# Patient Record
Sex: MALE | Race: WHITE | URBAN - METROPOLITAN AREA
[De-identification: names, ages, dates, MRNs, and addresses within clinical notes are randomized per-mention and may not be internally consistent; named-entity substitution may affect disease eponyms.]

---

## 2022-06-16 ENCOUNTER — OFFICE VISIT (OUTPATIENT)
Dept: URBAN - METROPOLITAN AREA CLINIC 27 | Facility: CLINIC | Age: 77
End: 2022-06-16
Payer: MEDICARE

## 2022-06-16 DIAGNOSIS — H43.811 VITREOUS DEGENERATION, RIGHT EYE: ICD-10-CM

## 2022-06-16 DIAGNOSIS — H35.352 CYSTOID MACULAR DEGENERATION, LEFT EYE: ICD-10-CM

## 2022-06-16 DIAGNOSIS — H33.41 PROLIFERATIVE VITREO-RETINOPATHY W/ RETINAL DETACHMENT OF RIGHT EYE: Primary | ICD-10-CM

## 2022-06-16 DIAGNOSIS — H33.8 OTHER RETINAL DETACHMENTS: ICD-10-CM

## 2022-06-16 DIAGNOSIS — Z96.1 PRESENCE OF INTRAOCULAR LENS: ICD-10-CM

## 2022-06-16 PROCEDURE — 99204 OFFICE O/P NEW MOD 45 MIN: CPT | Performed by: OPHTHALMOLOGY

## 2022-06-16 PROCEDURE — 92134 CPTRZ OPH DX IMG PST SGM RTA: CPT | Performed by: OPHTHALMOLOGY

## 2022-06-16 RX ORDER — PREDNISOLONE ACETATE 10 MG/ML
1 % SUSPENSION/ DROPS OPHTHALMIC
Qty: 5 | Refills: 3 | Status: INACTIVE
Start: 2022-06-16 | End: 2022-09-13

## 2022-06-16 RX ORDER — KETOROLAC TROMETHAMINE 5 MG/ML
0.5 % SOLUTION OPHTHALMIC
Qty: 5 | Refills: 3 | Status: INACTIVE
Start: 2022-06-16 | End: 2022-09-13

## 2022-06-16 ASSESSMENT — INTRAOCULAR PRESSURE
OS: 16
OD: 15

## 2022-06-16 NOTE — IMPRESSION/PLAN
Impression: Other retinal detachments: H33.8.
s/p SB/PPV/EL/AFX/C3F8 OS - 06/18/2020 Plan: --retina remains attached and stable
--RDW discussed

## 2022-06-16 NOTE — IMPRESSION/PLAN
Impression: Vitreous degeneration, right eye: H43.448.  Plan: --proceed with surgery as documented above

## 2022-06-16 NOTE — IMPRESSION/PLAN
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 Plan: --exam confirms a macula-involving RD with PVR-C
--findings/diagnosis d/w pt in detail --urgent surgery advised to prevent further vision loss --r/b/a of SB, PPV, and pneumatic retinopexy discussed
--risks discussed, inc bleeding, pain, infection, loss of vision --pt understands that pre-RD VA unlikely to fully return --pt elects to proceed with RD repair SURGICAL PLAN: 25G PPV/SB/MP/EL/GAS vs SO OD

## 2022-06-16 NOTE — IMPRESSION/PLAN
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  Plan: --moderate ERM with CME OS
--start PF/ketorolac QID OS
--will consider PPV/MP at a later date

## 2022-06-22 ENCOUNTER — POST-OPERATIVE VISIT (OUTPATIENT)
Dept: URBAN - METROPOLITAN AREA CLINIC 27 | Facility: CLINIC | Age: 77
End: 2022-06-22
Payer: MEDICARE

## 2022-06-22 DIAGNOSIS — H33.41 PROLIFERATIVE VITREO-RETINOPATHY W/ RETINAL DETACHMENT OF RIGHT EYE: Primary | ICD-10-CM

## 2022-06-22 PROCEDURE — 99024 POSTOP FOLLOW-UP VISIT: CPT | Performed by: OPHTHALMOLOGY

## 2022-06-22 ASSESSMENT — INTRAOCULAR PRESSURE
OD: 21
OS: 15

## 2022-06-22 NOTE — IMPRESSION/PLAN
Impression: S/P 25G PPV/SB/MP/EL/GAS vs SO OD - 06/21/2022 () Plan: Retina is attached. No s/s of infection IOP is good at (21) Drops Vigamox QID Pred QID 

RTC 1 week

## 2022-06-30 ENCOUNTER — POST-OPERATIVE VISIT (OUTPATIENT)
Dept: URBAN - METROPOLITAN AREA CLINIC 27 | Facility: CLINIC | Age: 77
End: 2022-06-30
Payer: MEDICARE

## 2022-06-30 DIAGNOSIS — H33.41 PROLIFERATIVE VITREO-RETINOPATHY W/ RETINAL DETACHMENT OF RIGHT EYE: Primary | ICD-10-CM

## 2022-06-30 PROCEDURE — 99024 POSTOP FOLLOW-UP VISIT: CPT | Performed by: OPHTHALMOLOGY

## 2022-06-30 ASSESSMENT — INTRAOCULAR PRESSURE
OS: 13
OD: 21

## 2022-06-30 NOTE — IMPRESSION/PLAN
Impression: S/P 25G PPV/SB/MP/EL/GAS vs SO OD - 9 Days. 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  Plan: --retina attached under gas
--no s/s infection
--IOP acceptable
--d/c Oflox, taper PF as directed
--RD/endoph WS discussed
--alt restrictions/positioning discussed
--f/u 1 month with PO/OCT OD

## 2022-08-11 ENCOUNTER — POST-OPERATIVE VISIT (OUTPATIENT)
Dept: URBAN - METROPOLITAN AREA CLINIC 27 | Facility: CLINIC | Age: 77
End: 2022-08-11
Payer: MEDICARE

## 2022-08-11 DIAGNOSIS — H33.41 PROLIFERATIVE VITREO-RETINOPATHY W/ RETINAL DETACHMENT OF RIGHT EYE: Primary | ICD-10-CM

## 2022-08-11 PROCEDURE — 99024 POSTOP FOLLOW-UP VISIT: CPT | Performed by: OPHTHALMOLOGY

## 2022-08-11 PROCEDURE — 92134 CPTRZ OPH DX IMG PST SGM RTA: CPT | Performed by: OPHTHALMOLOGY

## 2022-08-11 RX ORDER — OFLOXACIN 3 MG/ML
0.3 % SOLUTION/ DROPS OPHTHALMIC
Qty: 5 | Refills: 0 | Status: INACTIVE
Start: 2022-08-11 | End: 2022-09-09

## 2022-08-11 RX ORDER — PREDNISOLONE ACETATE 10 MG/ML
1 % SUSPENSION/ DROPS OPHTHALMIC
Qty: 5 | Refills: 3 | Status: INACTIVE
Start: 2022-08-11 | End: 2022-11-08

## 2022-08-11 ASSESSMENT — INTRAOCULAR PRESSURE
OS: 13
OD: 15

## 2022-08-11 NOTE — IMPRESSION/PLAN
Impression: S/P 25G PPV/SB/MP/EL/GAS vs SO OD - 51 Days. 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 Plan: --unfortunately there is a recurrent RD secondary to PVR-C
--additional surgery advised to prevent further vision loss --r/b/a of PPV, MP, EL, and GAS vs SO discussed
--risks discussed, inc bleeding, pain, infection, loss of vision --pt understands that pre-RD vision unlikely to fully return --pt elects to proceed with RD repair SURGICAL PLAN: 25G PPV/MP/EL/SO OD

## 2022-08-18 ENCOUNTER — POST-OPERATIVE VISIT (OUTPATIENT)
Dept: URBAN - METROPOLITAN AREA CLINIC 27 | Facility: CLINIC | Age: 77
End: 2022-08-18
Payer: MEDICARE

## 2022-08-18 DIAGNOSIS — H33.41 PROLIFERATIVE VITREO-RETINOPATHY W/ RETINAL DETACHMENT OF RIGHT EYE: Primary | ICD-10-CM

## 2022-08-18 PROCEDURE — 99024 POSTOP FOLLOW-UP VISIT: CPT | Performed by: OPHTHALMOLOGY

## 2022-08-18 ASSESSMENT — INTRAOCULAR PRESSURE
OS: 15
OD: 23

## 2022-08-18 NOTE — IMPRESSION/PLAN
Impression: S/P 25G PPV/MP/EL/SO OD OD - 1 Day.  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
25G PPV/SB/MP/EL/GAS OD 06/12/22 Plan: --retina attached under oil
--no s/s infection
--IOP acceptable
--start PF/Oflox QID
--RD/endoph WS discussed
--positioning discussed
--f/u 1 week PO OD

## 2022-08-25 ENCOUNTER — POST-OPERATIVE VISIT (OUTPATIENT)
Dept: URBAN - METROPOLITAN AREA CLINIC 27 | Facility: CLINIC | Age: 77
End: 2022-08-25
Payer: MEDICARE

## 2022-08-25 DIAGNOSIS — H33.41 PROLIFERATIVE VITREO-RETINOPATHY W/ RETINAL DETACHMENT OF RIGHT EYE: Primary | ICD-10-CM

## 2022-08-25 PROCEDURE — 99024 POSTOP FOLLOW-UP VISIT: CPT | Performed by: OPHTHALMOLOGY

## 2022-08-25 ASSESSMENT — INTRAOCULAR PRESSURE
OD: 11
OS: 13

## 2022-08-25 NOTE — IMPRESSION/PLAN
Impression: S/P 25G PPV/MP/EL/SO OD - 8 Days. 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  Plan: --retina attached under oil
--no s/s infection
--IOP acceptable
--d/c oflox, taper PF as directed
--RD/endoph WS discussed
--f/u 1 month with PO/OCT OD

## 2022-10-06 ENCOUNTER — POST-OPERATIVE VISIT (OUTPATIENT)
Dept: URBAN - METROPOLITAN AREA CLINIC 27 | Facility: CLINIC | Age: 77
End: 2022-10-06
Payer: MEDICARE

## 2022-10-06 DIAGNOSIS — H33.41 PROLIFERATIVE VITREO-RETINOPATHY W/ RETINAL DETACHMENT OF RIGHT EYE: Primary | ICD-10-CM

## 2022-10-06 PROCEDURE — 92134 CPTRZ OPH DX IMG PST SGM RTA: CPT | Performed by: OPHTHALMOLOGY

## 2022-10-06 PROCEDURE — 99024 POSTOP FOLLOW-UP VISIT: CPT | Performed by: OPHTHALMOLOGY

## 2022-10-06 ASSESSMENT — INTRAOCULAR PRESSURE
OS: 19
OD: 15

## 2022-10-06 NOTE — IMPRESSION/PLAN
Impression: S/P 25G PPV/MP/EL/SO OD - 50 Days. 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
s/p 25G PPV/SB/MP/EL/GAS OD - 06/21/2022 Plan: --PVR with inferior SRF
--no s/s infection
--IOP acceptable
--OCT shows dense ERM/PVR
--rec observation at this time RTC 4-6 weeks DFE/OCT OU

## 2022-11-03 ENCOUNTER — OFFICE VISIT (OUTPATIENT)
Dept: URBAN - METROPOLITAN AREA CLINIC 27 | Facility: CLINIC | Age: 77
End: 2022-11-03
Payer: MEDICARE

## 2022-11-03 DIAGNOSIS — H33.8 OTHER RETINAL DETACHMENTS: ICD-10-CM

## 2022-11-03 DIAGNOSIS — H33.41 TRACTION DETACHMENT OF RETINA, RIGHT EYE: Primary | ICD-10-CM

## 2022-11-03 DIAGNOSIS — H35.352 CYSTOID MACULAR DEGENERATION, LEFT EYE: ICD-10-CM

## 2022-11-03 DIAGNOSIS — H43.811 VITREOUS DEGENERATION, RIGHT EYE: ICD-10-CM

## 2022-11-03 DIAGNOSIS — Z96.1 PRESENCE OF INTRAOCULAR LENS: ICD-10-CM

## 2022-11-03 PROCEDURE — 99214 OFFICE O/P EST MOD 30 MIN: CPT | Performed by: OPHTHALMOLOGY

## 2022-11-03 PROCEDURE — 92134 CPTRZ OPH DX IMG PST SGM RTA: CPT | Performed by: OPHTHALMOLOGY

## 2022-11-03 RX ORDER — KETOROLAC TROMETHAMINE 5 MG/ML
0.5 % SOLUTION OPHTHALMIC
Qty: 5 | Refills: 3 | Status: INACTIVE
Start: 2022-11-03 | End: 2023-01-31

## 2022-11-03 ASSESSMENT — INTRAOCULAR PRESSURE
OS: 13
OD: 15

## 2022-11-03 NOTE — IMPRESSION/PLAN
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
s/p 25G PPV/SB/MP/EL/GAS OD - 06/21/2022 S/P 25G PPV/MP/EL/SO OD 08/17/2022 Plan: --PVR with inferior SRF
--no s/s infection
--IOP acceptable
--OCT shows dense ERM/PVR
--rec PPV/MP/EL/SO exchange OD
--pt will consider surgery in Dec

RTC 4-6 weeks DFE/OCT OU

## 2022-11-03 NOTE — IMPRESSION/PLAN
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  Plan: --moderate ERM with CME OS
--restart PF/ketorolac QID OS
--will consider PPV/MP at a later date

## 2022-11-29 ENCOUNTER — OFFICE VISIT (OUTPATIENT)
Dept: URBAN - METROPOLITAN AREA CLINIC 27 | Facility: CLINIC | Age: 77
End: 2022-11-29
Payer: MEDICARE

## 2022-11-29 DIAGNOSIS — H35.352 CYSTOID MACULAR DEGENERATION, LEFT EYE: ICD-10-CM

## 2022-11-29 DIAGNOSIS — H43.811 VITREOUS DEGENERATION, RIGHT EYE: ICD-10-CM

## 2022-11-29 DIAGNOSIS — H33.41 TRACTION DETACHMENT OF RETINA, RIGHT EYE: Primary | ICD-10-CM

## 2022-11-29 DIAGNOSIS — Z96.1 PRESENCE OF INTRAOCULAR LENS: ICD-10-CM

## 2022-11-29 DIAGNOSIS — H33.8 OTHER RETINAL DETACHMENTS: ICD-10-CM

## 2022-11-29 PROCEDURE — 92134 CPTRZ OPH DX IMG PST SGM RTA: CPT | Performed by: OPHTHALMOLOGY

## 2022-11-29 PROCEDURE — 99214 OFFICE O/P EST MOD 30 MIN: CPT | Performed by: OPHTHALMOLOGY

## 2022-11-29 ASSESSMENT — INTRAOCULAR PRESSURE
OS: 15
OD: 13

## 2022-11-29 NOTE — IMPRESSION/PLAN
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  Plan: --moderate ERM with improving CME OS
--cont PF/ketorolac BID OS
--will consider PPV/MP at a later date

## 2022-11-29 NOTE — IMPRESSION/PLAN
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
s/p 25G PPV/SB/MP/EL/GAS OD - 06/21/2022 S/P 25G PPV/MP/EL/SO OD 08/17/2022 Plan: --inferior RD secondary to PVR-C under oil
--additional surgery advised to prevent further vision loss --r/b/a of PPV, SB, MP, EL, and GAS vs SO discussed
--risks discussed, inc bleeding, pain, infection, loss of vision --pt understands that pre-RD vision unlikely to fully return --pt elects to proceed with RD repair SURGICAL PLAN: 25G PPV/SO removal/MP/EL/GAS vs SO OD

## 2022-12-08 ENCOUNTER — POST-OPERATIVE VISIT (OUTPATIENT)
Dept: URBAN - METROPOLITAN AREA CLINIC 35 | Facility: CLINIC | Age: 77
End: 2022-12-08
Payer: MEDICARE

## 2022-12-08 DIAGNOSIS — H33.41 TRACTION DETACHMENT OF RETINA, RIGHT EYE: Primary | ICD-10-CM

## 2022-12-08 PROCEDURE — 99024 POSTOP FOLLOW-UP VISIT: CPT | Performed by: OPHTHALMOLOGY

## 2022-12-08 ASSESSMENT — INTRAOCULAR PRESSURE
OD: 14
OS: 15

## 2022-12-08 NOTE — IMPRESSION/PLAN
Impression: S/P 25G PPV/SO removal/MP/EL/GAS vs SO OD OD - 1 Day. Traction detachment of retina, right eye  H33.41. Plan: Avoid supine FDP or LSD
1 week PO with SM --Continue Prednisolone acetate 1%--Continue Ofloxacin 0.3%

## 2022-12-13 ENCOUNTER — POST-OPERATIVE VISIT (OUTPATIENT)
Dept: URBAN - METROPOLITAN AREA CLINIC 27 | Facility: CLINIC | Age: 77
End: 2022-12-13
Payer: MEDICARE

## 2022-12-13 DIAGNOSIS — H33.41 PROLIFERATIVE VITREO-RETINOPATHY W/ RETINAL DETACHMENT OF RIGHT EYE: Primary | ICD-10-CM

## 2022-12-13 PROCEDURE — 99024 POSTOP FOLLOW-UP VISIT: CPT | Performed by: OPHTHALMOLOGY

## 2022-12-13 ASSESSMENT — INTRAOCULAR PRESSURE
OD: 16
OS: 16

## 2022-12-13 NOTE — IMPRESSION/PLAN
Impression: S/P 25G PPV/SO removal/MP/EL/GAS vs SO OD- 6 Days. 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  Plan: --retina attached under oil
--no s/s infection
--IOP acceptable
--d/c oflox, taper PF as directed
--RD/endoph WS discussed
--f/u 1 month with PO/OCT OD

## 2023-01-10 ENCOUNTER — POST-OPERATIVE VISIT (OUTPATIENT)
Dept: URBAN - METROPOLITAN AREA CLINIC 27 | Facility: CLINIC | Age: 78
End: 2023-01-10
Payer: MEDICARE

## 2023-01-10 DIAGNOSIS — H33.41 PROLIFERATIVE VITREO-RETINOPATHY W/ RETINAL DETACHMENT OF RIGHT EYE: Primary | ICD-10-CM

## 2023-01-10 PROCEDURE — 67028 INJECTION EYE DRUG: CPT | Performed by: OPHTHALMOLOGY

## 2023-01-10 PROCEDURE — 99024 POSTOP FOLLOW-UP VISIT: CPT | Performed by: OPHTHALMOLOGY

## 2023-01-10 PROCEDURE — 92134 CPTRZ OPH DX IMG PST SGM RTA: CPT | Performed by: OPHTHALMOLOGY

## 2023-01-10 ASSESSMENT — INTRAOCULAR PRESSURE
OD: 12
OS: 12

## 2023-01-10 NOTE — IMPRESSION/PLAN
Impression: S/P 25G PPV/SO removal/MP/EL/GAS vs SO OD OD - 34 Days. 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 Plan: --retina attached under oil
--no s/s infection
--IOP acceptable
--OCT shows dense CME
--recommend PSTK OD today
--RBA discussed, pt elects PSTK OD
--RD/endoph WS discussed RTC 6-8 weeks DFE/OCT OU

## 2023-04-18 ENCOUNTER — OFFICE VISIT (OUTPATIENT)
Dept: URBAN - METROPOLITAN AREA CLINIC 27 | Facility: CLINIC | Age: 78
End: 2023-04-18
Payer: MEDICARE

## 2023-04-18 DIAGNOSIS — H33.41 TRACTION DETACHMENT OF RETINA, RIGHT EYE: Primary | ICD-10-CM

## 2023-04-18 DIAGNOSIS — H43.811 VITREOUS DEGENERATION, RIGHT EYE: ICD-10-CM

## 2023-04-18 DIAGNOSIS — H33.8 OTHER RETINAL DETACHMENTS: ICD-10-CM

## 2023-04-18 DIAGNOSIS — Z96.1 PRESENCE OF INTRAOCULAR LENS: ICD-10-CM

## 2023-04-18 DIAGNOSIS — H35.352 CYSTOID MACULAR DEGENERATION, LEFT EYE: ICD-10-CM

## 2023-04-18 PROCEDURE — 92134 CPTRZ OPH DX IMG PST SGM RTA: CPT | Performed by: OPHTHALMOLOGY

## 2023-04-18 PROCEDURE — 99213 OFFICE O/P EST LOW 20 MIN: CPT | Performed by: OPHTHALMOLOGY

## 2023-04-18 ASSESSMENT — INTRAOCULAR PRESSURE
OD: 13
OS: 11

## 2023-04-18 NOTE — IMPRESSION/PLAN
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 S/P 25G PPV/SO removal/MP/EL/GAS vs SO OD 12/07/2022 Plan: --exam and OCT show retina remains fully attached
--OCT confirms dense CME, no improvement after PSTK
--discussed findings with pt in detail --discussed SO removal including risk of recurrent RD
--pt concerned SO removal may unmask diplopia --pt elects observation at this time RTC 6 months DFE/OCT OU

## 2023-04-18 NOTE — IMPRESSION/PLAN
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  Plan: --moderate ERM with improving CME OS
--recommend observation

## 2024-03-05 ENCOUNTER — OFFICE VISIT (OUTPATIENT)
Dept: URBAN - METROPOLITAN AREA CLINIC 27 | Facility: CLINIC | Age: 79
End: 2024-03-05
Payer: MEDICARE

## 2024-03-05 DIAGNOSIS — Z96.1 PRESENCE OF INTRAOCULAR LENS: ICD-10-CM

## 2024-03-05 DIAGNOSIS — H33.41 PROLIFERATIVE VITREO-RETINOPATHY W/ RETINAL DETACHMENT OF RIGHT EYE: Primary | ICD-10-CM

## 2024-03-05 DIAGNOSIS — H35.352 CYSTOID MACULAR DEGENERATION, LEFT EYE: ICD-10-CM

## 2024-03-05 DIAGNOSIS — H33.8 OTHER RETINAL DETACHMENTS: ICD-10-CM

## 2024-03-05 DIAGNOSIS — H43.811 VITREOUS DEGENERATION, RIGHT EYE: ICD-10-CM

## 2024-03-05 DIAGNOSIS — H40.89 OTHER SPECIFIED GLAUCOMA: ICD-10-CM

## 2024-03-05 PROCEDURE — 92134 CPTRZ OPH DX IMG PST SGM RTA: CPT | Performed by: OPHTHALMOLOGY

## 2024-03-05 PROCEDURE — 67028 INJECTION EYE DRUG: CPT | Performed by: OPHTHALMOLOGY

## 2024-03-05 PROCEDURE — 99214 OFFICE O/P EST MOD 30 MIN: CPT | Performed by: OPHTHALMOLOGY

## 2024-03-05 RX ORDER — BRIMONIDINE TARTRATE 2 MG/ML
0.2 % SOLUTION/ DROPS OPHTHALMIC
Qty: 5 | Refills: 3 | Status: ACTIVE
Start: 2024-03-05

## 2024-03-05 ASSESSMENT — INTRAOCULAR PRESSURE
OS: 14
OD: 28

## 2024-05-20 ENCOUNTER — OFFICE VISIT (OUTPATIENT)
Dept: URBAN - METROPOLITAN AREA CLINIC 7 | Facility: CLINIC | Age: 79
End: 2024-05-20
Payer: MEDICARE

## 2024-05-20 DIAGNOSIS — H33.8 OTHER RETINAL DETACHMENTS: ICD-10-CM

## 2024-05-20 DIAGNOSIS — H33.41 TRACTION DETACHMENT OF RETINA, RIGHT EYE: Primary | ICD-10-CM

## 2024-05-20 DIAGNOSIS — H40.89 OTHER SPECIFIED GLAUCOMA: ICD-10-CM

## 2024-05-20 DIAGNOSIS — Z96.1 PRESENCE OF INTRAOCULAR LENS: ICD-10-CM

## 2024-05-20 DIAGNOSIS — H35.352 CYSTOID MACULAR DEGENERATION, LEFT EYE: ICD-10-CM

## 2024-05-20 DIAGNOSIS — H43.811 VITREOUS DEGENERATION, RIGHT EYE: ICD-10-CM

## 2024-05-20 PROCEDURE — 92134 CPTRZ OPH DX IMG PST SGM RTA: CPT | Performed by: OPHTHALMOLOGY

## 2024-05-20 PROCEDURE — 67028 INJECTION EYE DRUG: CPT | Performed by: OPHTHALMOLOGY

## 2024-05-20 PROCEDURE — 99213 OFFICE O/P EST LOW 20 MIN: CPT | Performed by: OPHTHALMOLOGY

## 2024-05-20 ASSESSMENT — INTRAOCULAR PRESSURE
OD: 22
OS: 16

## 2025-08-06 ENCOUNTER — APPOINTMENT (OUTPATIENT)
Dept: URBAN - METROPOLITAN AREA CLINIC 321 | Facility: CLINIC | Age: 80
Setting detail: DERMATOLOGY
End: 2025-08-06

## 2025-08-06 DIAGNOSIS — L82.1 OTHER SEBORRHEIC KERATOSIS: ICD-10-CM | Status: STABLE

## 2025-08-06 DIAGNOSIS — D485 NEOPLASM OF UNCERTAIN BEHAVIOR OF SKIN: ICD-10-CM | Status: INADEQUATELY CONTROLLED

## 2025-08-06 DIAGNOSIS — L81.4 OTHER MELANIN HYPERPIGMENTATION: ICD-10-CM | Status: STABLE

## 2025-08-06 PROBLEM — D48.5 NEOPLASM OF UNCERTAIN BEHAVIOR OF SKIN: Status: ACTIVE | Noted: 2025-08-06

## 2025-08-06 PROCEDURE — ? COUNSELING

## 2025-08-06 PROCEDURE — ? PATIENT SPECIFIC COUNSELING

## 2025-08-06 PROCEDURE — ? REFUSAL OF TREATMENT

## 2025-08-06 ASSESSMENT — LOCATION ZONE DERM
LOCATION ZONE: FACE
LOCATION ZONE: ARM

## 2025-08-06 ASSESSMENT — LOCATION DETAILED DESCRIPTION DERM
LOCATION DETAILED: RIGHT INFERIOR CENTRAL MALAR CHEEK
LOCATION DETAILED: INFERIOR MID FOREHEAD
LOCATION DETAILED: LEFT DISTAL DORSAL FOREARM
LOCATION DETAILED: RIGHT DISTAL DORSAL FOREARM
LOCATION DETAILED: RIGHT LATERAL ZYGOMA
LOCATION DETAILED: RIGHT PROXIMAL DORSAL FOREARM
LOCATION DETAILED: LEFT PROXIMAL DORSAL FOREARM

## 2025-08-06 ASSESSMENT — LOCATION SIMPLE DESCRIPTION DERM
LOCATION SIMPLE: LEFT FOREARM
LOCATION SIMPLE: INFERIOR FOREHEAD
LOCATION SIMPLE: RIGHT FOREARM
LOCATION SIMPLE: RIGHT ZYGOMA
LOCATION SIMPLE: RIGHT CHEEK